# Patient Record
Sex: FEMALE | Race: WHITE | NOT HISPANIC OR LATINO | URBAN - METROPOLITAN AREA
[De-identification: names, ages, dates, MRNs, and addresses within clinical notes are randomized per-mention and may not be internally consistent; named-entity substitution may affect disease eponyms.]

---

## 2017-01-08 ENCOUNTER — EMERGENCY (EMERGENCY)
Facility: HOSPITAL | Age: 47
LOS: 1 days | Discharge: HOME | End: 2017-01-08
Attending: EMERGENCY MEDICINE | Admitting: EMERGENCY MEDICINE
Payer: COMMERCIAL

## 2017-01-08 VITALS
SYSTOLIC BLOOD PRESSURE: 127 MMHG | RESPIRATION RATE: 18 BRPM | TEMPERATURE: 98 F | HEIGHT: 64 IN | DIASTOLIC BLOOD PRESSURE: 85 MMHG | WEIGHT: 169.98 LBS | OXYGEN SATURATION: 96 % | HEART RATE: 82 BPM

## 2017-01-08 VITALS
HEART RATE: 81 BPM | TEMPERATURE: 98 F | RESPIRATION RATE: 16 BRPM | DIASTOLIC BLOOD PRESSURE: 83 MMHG | OXYGEN SATURATION: 97 % | SYSTOLIC BLOOD PRESSURE: 122 MMHG

## 2017-01-08 DIAGNOSIS — M54.5 LOW BACK PAIN: ICD-10-CM

## 2017-01-08 PROCEDURE — 99283 EMERGENCY DEPT VISIT LOW MDM: CPT | Mod: 25

## 2017-01-08 RX ORDER — METHOCARBAMOL 500 MG/1
1000 TABLET, FILM COATED ORAL ONCE
Qty: 0 | Refills: 0 | Status: COMPLETED | OUTPATIENT
Start: 2017-01-08 | End: 2017-01-08

## 2017-01-08 RX ORDER — METHOCARBAMOL 500 MG/1
1 TABLET, FILM COATED ORAL
Qty: 15 | Refills: 0 | OUTPATIENT
Start: 2017-01-08 | End: 2017-01-13

## 2017-01-08 RX ADMIN — Medication 500 MILLIGRAM(S): at 02:03

## 2017-01-08 RX ADMIN — METHOCARBAMOL 1000 MILLIGRAM(S): 500 TABLET, FILM COATED ORAL at 02:03

## 2017-01-08 RX ADMIN — Medication 500 MILLIGRAM(S): at 03:15

## 2017-01-08 NOTE — ED PROVIDER NOTE - PROGRESS NOTE DETAILS
slight improvement of symptoms.  Requesting to go home.  Conservative management discussed with the patient in detail.  Close PMD follow up encouraged.  Strict ED return instructions discussed in detail and patient given the opportunity to ask any questions about their discharge diagnosis and instructions

## 2017-01-08 NOTE — ED PROVIDER NOTE - MEDICAL DECISION MAKING DETAILS
low back pain from mechanical fall.  Denies signs or symptoms of cauda equina or sciatica.  Steady gait, no midline tenderness.  Denies pain prior to fall.  Pain medication, muscle relaxant, anti-inflammatory ordered.

## 2020-11-04 ENCOUNTER — EMERGENCY (EMERGENCY)
Facility: HOSPITAL | Age: 50
LOS: 1 days | Discharge: HOME | End: 2020-11-04
Admitting: EMERGENCY MEDICINE
Payer: COMMERCIAL

## 2020-11-04 VITALS
OXYGEN SATURATION: 96 % | DIASTOLIC BLOOD PRESSURE: 79 MMHG | RESPIRATION RATE: 18 BRPM | WEIGHT: 179.9 LBS | SYSTOLIC BLOOD PRESSURE: 129 MMHG | HEART RATE: 100 BPM | HEIGHT: 64 IN | TEMPERATURE: 98 F

## 2020-11-04 PROCEDURE — 99282 EMERGENCY DEPT VISIT SF MDM: CPT

## 2020-11-04 NOTE — ED PROVIDER NOTE - CARE PROVIDER_API CALL
Ericka Kaur (DO)  [place] [address]  Phone: [phone]  Fax: [phone]  Follow Up Time: 1-3 Days    [place] [address]  Phone: [phone]  Fax: [phone]  Follow Up Time: 1-3 Days

## 2020-11-04 NOTE — ED PROVIDER NOTE - CHPI ED SYMPTOMS NEG
no chills/no fever/no dizziness, no syncope, no SOB, no palpitations, no numbness, no weakness, no open wounds, no hematuria, no melena, no hematochezia, no abnormal bleeding

## 2020-11-04 NOTE — ED PROVIDER NOTE - PROVIDER TOKENS
PROVIDER:[TOKEN:[[id number]],FOLLOWUP:[1-3 Days]],PROVIDER:[TOKEN:[[id number]],FOLLOWUP:[1-3 Days]]

## 2020-11-04 NOTE — ED ADULT NURSE NOTE - NSIMPLEMENTINTERV_GEN_ALL_ED
Implemented All Universal Safety Interventions:  [place] to call system. Call bell, personal items and telephone within reach. Instruct patient to call for assistance. Room bathroom lighting operational. Non-slip footwear when patient is off stretcher. Physically safe environment: no spills, clutter or unnecessary equipment. Stretcher in lowest position, wheels locked, appropriate side rails in place.

## 2020-11-04 NOTE — ED PROVIDER NOTE - CLINICAL SUMMARY MEDICAL DECISION MAKING FREE TEXT BOX
Patient presenting with local ecchymosis to right forearm with no other concerning findings of exam. No other associated symptoms at this time. Strict return precautions discussed with Patient in which Patient verbalizes understanding and agrees to. Patient to f/u with PMD in 2 days.

## 2020-11-04 NOTE — ED PROVIDER NOTE - SKIN, MLM
Approximately 3cm x 5cm ecchymosis to the dorsum of the right forearm which has approximately 1cm lump to center which is mildly tender to palpation. No open wounds, no warmth, no streaking, no fluctuance, no discharge, no bleeding. No edema or crepitus. Neurovascularly intact distally. Compartments soft.

## 2020-11-04 NOTE — ED PROVIDER NOTE - CARE PROVIDERS DIRECT ADDRESSES
[email or web address]

## 2020-11-04 NOTE — ED ADULT NURSE NOTE - OBJECTIVE STATEMENT
Pt p/w a bruise - believes it may be an infection. Bruise appears to be in the healing stages with yellowish border, no large hematoma or s/sx of infection. Patient has full ROM of the extremity.

## 2020-11-04 NOTE — ED PROVIDER NOTE - PATIENT PORTAL LINK FT
You can access the FollowMyHealth Patient Portal offered by [facility] by registering at the following website: [email or web address]. By joining [facility]’s FollowMyHealth portal, you will also be able to view your health information using other applications (apps) compatible with our system.

## 2020-11-04 NOTE — ED ADULT NURSE NOTE - CHPI ED NUR SYMPTOMS NEG
no tingling/no dizziness/no chills/no pain/no nausea/no fever/no weakness/no decreased eating/drinking/no vomiting

## 2020-11-04 NOTE — ED PROVIDER NOTE - RESPIRATORY NEGATIVE STATEMENT, MLM
Assessment and plan  1. Controlled type 2 diabetes mellitus without complication, without long-term current use of insulin (HCC)  Stable  - POCT glycosylated hemoglobin (Hb A1C)  - [employer] Lancets Ultra Thin MISC; 1 each by Does not apply route daily  Dispense: 100 each; Refill: 3  - blood glucose monitor strips; Test 1 times a day  Dispense: 100 strip; Refill: 3    2. Allergic sinusitis  Uncontrolled. Discussed options, she feels she is at the point she needs some prednisone. 3. Moderate intermittent asthma without complication  Stable. She will return for her flu vaccine later    4. Benign essential hypertension  Stable    5. Gastroesophageal reflux disease without esophagitis  Stable    6. Primary osteoarthritis involving multiple joints  Stable    7. Pure hypercholesterolemia  - Lipid Panel; Future    Healthy Family prevention recommendations given. Continue all current prescription medications as listed below. RTC 4 months or sooner prn. Subjective  Patient returns for reevaluation of her medical problems including diabetes, hypertension, allergies, asthma, reflux, and hypercholesterolemia. Her diabetes is doing well, she is lost 3 pounds and A1c is down to 6.2%. Her blood pressure is good. Her osteoarthritis is doing well with meloxicam.  She is rarely had to use albuterol. No heartburn or dysphasia. She is troubled by ongoing persisting sinusitis symptoms. It was not relieved with a Z-Kev. She is still on Zyrtec Singulair and Flonase. Medications: see list below  Allergies   Allergen Reactions    Flagyl [Metronidazole]      hives    Daypro [Oxaprozin]     Sulfa Antibiotics     Tramadol      drowsy    Amlodipine      constipation    Lisinopril      Cough       Past history:  She did get her diabetic eye exam in July. She also has seen Dr. Rojas Bhatt for injections.     Review of systems:  Constitutional:  fatigue - no                                                  abnormal weight loss
Flu vaccine not given today patient will return to office
no chest pain, no cough, and no shortness of breath.

## 2020-11-04 NOTE — ED ADULT TRIAGE NOTE - CHIEF COMPLAINT QUOTE
Pt presents to ED with growing bruise to right forearm, denies any traumas or falls. Site is not hot to touch, no visible redness.

## 2020-11-04 NOTE — ED PROVIDER NOTE - OBJECTIVE STATEMENT
49 y/o female with PMHx of HLD and hypothyroidism presents to the ED with complaints of bruising to the right forearm for the past 3 days. She does not recall any recent injuries or trauma to the area and states that she happened to notice the bruising approximately 2-3 days ago, which she states is mildly painful. She noticed that there is a small, firm bump in the center of bruising but states that she is able to move her forearm just fine and does not have any other associated symptoms at this time. Her  recommended that she seek medical attention since she does not recall injury to the area. Denies any other acute medical complaints at this time. Denies fever, chills, dizziness, syncope, SOB, palpitations, upper extremity numbness/weakness, open wounds, gross hematuria, melena, hematochezia, abnormal bleeding, or any other bruising on the body.

## 2020-11-04 NOTE — ED PROVIDER NOTE - NSFOLLOWUPINSTRUCTIONS_ED_ALL_ED_FT
PLEASE FOLLOW-UP WITH YOUR PRIMARY CARE DOCTOR IN 1-3 DAYS FOR FURTHER EVALUATION.  PLEASE TAKE ALL PAPERWORK FROM TODAY'S VISIT TO YOUR PRIMARY DOCTOR.  IF YOU DO NOT HAVE A PRIMARY CARE DOCTOR PLEASE REFER TO ONE OF THE PRIMARY CARE PROVIDERS GIVEN ABOVE.  YOU MAY ALSO CALL [phone] AND ASK FOR MS. BRINDA SIMON.  SHE CAN HELP YOU MAKE A FOLLOW-UP APPOINTMENT.  HER HOURS ARE 11AM-7PM MONDAY - FRIDAY.    PLEASE FOLLOW UP WITH ANY SPECIALISTS LISTED HERE WITHIN 1-3 DAYS AS DISCUSSED.    PLEASE RETURN TO THE ER IMMEDIATELY OR CALL 911 FOR ANY HIGH FEVER, CHEST PAIN, TROUBLE BREATHING, VOMITING, SEVERE PAIN, OR ANY OTHER CONCERNS

## 2020-11-04 NOTE — ED PROVIDER NOTE - GASTROINTESTINAL NEGATIVE STATEMENT, MLM
no abdominal pain, no bloating, no constipation, no diarrhea, no nausea, no vomiting, no hematochezia

## 2020-11-08 DIAGNOSIS — R22.31 LOCALIZED SWELLING, MASS AND LUMP, RIGHT UPPER LIMB: ICD-10-CM

## 2020-11-08 DIAGNOSIS — Z79.899 OTHER LONG TERM (CURRENT) DRUG THERAPY: ICD-10-CM

## 2020-11-08 DIAGNOSIS — Z79.1 LONG TERM (CURRENT) USE OF NON-STEROIDAL ANTI-INFLAMMATORIES (NSAID): ICD-10-CM

## 2020-11-08 DIAGNOSIS — M79.81 NONTRAUMATIC HEMATOMA OF SOFT TISSUE: ICD-10-CM

## 2020-11-08 DIAGNOSIS — M79.631 PAIN IN RIGHT FOREARM: ICD-10-CM

## 2020-11-08 DIAGNOSIS — E78.5 HYPERLIPIDEMIA, UNSPECIFIED: ICD-10-CM

## 2020-11-08 DIAGNOSIS — E03.9 HYPOTHYROIDISM, UNSPECIFIED: ICD-10-CM

## 2024-03-01 NOTE — ED ADULT TRIAGE NOTE - PAIN: PRESENCE, MLM
Subjective:      Patient ID: Rylee Orr is a 78 y.o. female.    HPI  Patient comes in with some concerns about nosebleeds that seem to be heavier on the left nostril but also has bled some from the right.  Last time she had a nosebleed was on Tuesday.  She does wear CPAP machine and also is on Eliquis and is planning to do a pacemaker in the near future.  She has been more tired.  Past Medical History:   Diagnosis Date    Aortic atherosclerosis (HCC)     noted on lumbar spine film 3/9/2023    Arrhythmia     palpitations.     Arthritis     rt shoulder    Atrial fibrillation (HCC) 01/21/2016    takes eliquis    Cancer (HCC)     skin ca on nose    Chest pain     Chronic obstructive pulmonary disease (HCC)     COPD exacerbation (HCC) 9/25/2017    Dyspnea     Essential hypertension 1/21/2016    GERD (gastroesophageal reflux disease)     managed by meds    HLD (hyperlipidemia) 1/21/2016    Hypertension     managed by meds    Menopause     Morbid obesity (HCC)     Nausea & vomiting     Pulmonary embolus (HCC) 1/21/2016    Sleep apnea     no cpap    Thromboembolus ([facility]) 4 month ago.     right leg    Venous embolism and thrombosis        Allergies   Allergen Reactions    Latex Other (See Comments) and Dermatitis     Other reaction(s): Rash-Allergy    Codeine Other (See Comments)     Headache  Other reaction(s): Other- (not listed) - Allergy  Headache    Shellfish Allergy Hives    Shellfish-Derived Products Hives       Current Outpatient Medications   Medication Sig Dispense Refill    rOPINIRole (REQUIP) 0.5 MG tablet TAKE 1 TABLET BY MOUTH EVERY DAY AT NIGHT 90 tablet 3    mupirocin (BACTROBAN) 2 % ointment Apply topically 2 times daily 7 days. 22 g 1    predniSONE 10 MG (48) TBPK As directed per package 1 each 0    OZEMPIC, 0.25 OR 0.5 MG/DOSE, 2 MG/3ML SOPN INJECT 0.25 UNDER THE SKIN WEEKLY FOR 2 WEEKS, THEN 0.5MG WEEKLY      gabapentin (NEURONTIN) 300 MG capsule Take 1 capsule by mouth 3 times daily for 90 days.  denies pain/discomfort
